# Patient Record
Sex: MALE | URBAN - METROPOLITAN AREA
[De-identification: names, ages, dates, MRNs, and addresses within clinical notes are randomized per-mention and may not be internally consistent; named-entity substitution may affect disease eponyms.]

---

## 2024-11-16 ENCOUNTER — OFFICE VISIT (OUTPATIENT)
Dept: URGENT CARE | Age: 24
End: 2024-11-16
Payer: COMMERCIAL

## 2024-11-16 VITALS
DIASTOLIC BLOOD PRESSURE: 102 MMHG | HEART RATE: 112 BPM | OXYGEN SATURATION: 97 % | TEMPERATURE: 98.3 F | BODY MASS INDEX: 42.66 KG/M2 | WEIGHT: 315 LBS | RESPIRATION RATE: 20 BRPM | HEIGHT: 72 IN | SYSTOLIC BLOOD PRESSURE: 139 MMHG

## 2024-11-16 DIAGNOSIS — S61.217A LACERATION OF LEFT LITTLE FINGER WITHOUT FOREIGN BODY WITHOUT DAMAGE TO NAIL, INITIAL ENCOUNTER: Primary | ICD-10-CM

## 2024-11-16 ASSESSMENT — ENCOUNTER SYMPTOMS
GASTROINTESTINAL NEGATIVE: 1
HEMATOLOGIC/LYMPHATIC NEGATIVE: 1
WOUND: 1
CARDIOVASCULAR NEGATIVE: 1
MUSCULOSKELETAL NEGATIVE: 1
PSYCHIATRIC NEGATIVE: 1
CONSTITUTIONAL NEGATIVE: 1
WEAKNESS: 0
ALLERGIC/IMMUNOLOGIC NEGATIVE: 1
RESPIRATORY NEGATIVE: 1
NUMBNESS: 0
EYES NEGATIVE: 1
ENDOCRINE NEGATIVE: 1

## 2024-11-16 NOTE — PATIENT INSTRUCTIONS
Wash wound with mild soap and water daily  Keep wound site clean and dry  Bacitracin and dry dressing daily  Start to keep wound open to the air in 2-3 days when it can be kept clean and dry  Suture removal in 10 days  Pcp follow up this week for wound check  ER visit anytime 24/7 for acute worsening or changing condition

## 2024-11-16 NOTE — PROGRESS NOTES
Subjective   Patient ID: Marlo Walden is a 24 y.o. male. They present today with a chief complaint of Laceration.    History of Present Illness    History provided by:  Patient   used: No    Laceration    This is a 24 yr old male here for left 5th finger laceration. Cut the finger on a household knife prior to  arrival. No difficulty with finger ROM, or parasthesias or weakness. Last tetanus unknown.  Past Medical History  Allergies as of 11/16/2024    (No Known Allergies)       (Not in a hospital admission)       No past medical history on file.    No past surgical history on file.     reports that he has never smoked. He has never used smokeless tobacco.    Review of Systems  Review of Systems   Constitutional: Negative.    HENT: Negative.     Eyes: Negative.    Respiratory: Negative.     Cardiovascular: Negative.    Gastrointestinal: Negative.    Endocrine: Negative.    Genitourinary: Negative.    Musculoskeletal: Negative.    Skin:  Positive for wound.   Allergic/Immunologic: Negative.    Neurological:  Negative for weakness and numbness.   Hematological: Negative.    Psychiatric/Behavioral: Negative.     All other systems reviewed and are negative.    Objective    Vitals:    11/16/24 1606   BP: (!) 139/102   BP Location: Right arm   Patient Position: Sitting   BP Cuff Size: Large adult   Pulse: (!) 112   Resp: 20   Temp: 36.8 °C (98.3 °F)   TempSrc: Oral   SpO2: 97%   Weight: (!) 163 kg (360 lb)   Height: 1.829 m (6')     No LMP for male patient.    Physical Exam  Vitals and nursing note reviewed.   Constitutional:       Appearance: Normal appearance.   HENT:      Head: Normocephalic and atraumatic.   Cardiovascular:      Rate and Rhythm: Normal rate and regular rhythm.   Pulmonary:      Effort: Pulmonary effort is normal.      Breath sounds: Normal breath sounds.   Musculoskeletal:         General: Signs of injury present.   Skin:     General: Skin is warm and dry.      Comments: 2 cm  laceration left 5th finger pad, Distal motor, n-v intact   Neurological:      General: No focal deficit present.      Mental Status: He is alert and oriented to person, place, and time.   Psychiatric:         Mood and Affect: Mood normal.         Behavior: Behavior normal.       Procedures  Left 5th finger laceration repair-Local anesthesia with 1% plain lidocaine. Wound cleansed with hibiclens and normal saline. Sterile draping placed. 5 4-0 nylon simple interrupted sutures placed. Hemostasis achieved. Bacitracin/DSD applied.     Point of Care Test & Imaging Results from this visit  No results found for this visit on 11/16/24.   No results found.    Diagnostic study results (if any) were reviewed by Liana Man PA-C.    Assessment/Plan   Allergies, medications, history, and pertinent labs/EKGs/Imaging reviewed by Liana Man PA-C.       Orders and Diagnoses  Diagnoses and all orders for this visit:  Laceration of left little finger without foreign body without damage to nail, initial encounter  -     Tdap vaccine, age 7 years and older  (BOOSTRIX)    Plan:  Laceration repair as above  Tetanus updated  Wound care daily with mild soap and water and bacitracin/DSD daily  SR in 10 days  Pcp follow up for wound check next week  ER visit anytime 24/7 for acute worsening or changing condition    Patient disposition: Home    Electronically signed by Liana Man PA-C  6:54 PM

## 2024-11-26 ENCOUNTER — OFFICE VISIT (OUTPATIENT)
Dept: URGENT CARE | Age: 24
End: 2024-11-26
Payer: COMMERCIAL

## 2024-11-26 VITALS
TEMPERATURE: 98.3 F | OXYGEN SATURATION: 98 % | HEART RATE: 98 BPM | SYSTOLIC BLOOD PRESSURE: 139 MMHG | BODY MASS INDEX: 42.66 KG/M2 | HEIGHT: 72 IN | RESPIRATION RATE: 20 BRPM | DIASTOLIC BLOOD PRESSURE: 96 MMHG | WEIGHT: 315 LBS

## 2024-11-26 DIAGNOSIS — Z48.02 VISIT FOR SUTURE REMOVAL: Primary | ICD-10-CM

## 2024-11-26 ASSESSMENT — ENCOUNTER SYMPTOMS
COUGH: 0
JOINT SWELLING: 0
COLOR CHANGE: 0
CHILLS: 0
WHEEZING: 0
NUMBNESS: 0
WOUND: 1
WEAKNESS: 0
MYALGIAS: 0
ARTHRALGIAS: 0
FEVER: 0
CHEST TIGHTNESS: 0
SHORTNESS OF BREATH: 0

## 2024-11-26 ASSESSMENT — PAIN SCALES - GENERAL: PAINLEVEL_OUTOF10: 0-NO PAIN

## 2024-11-26 NOTE — PROGRESS NOTES
Subjective   Patient ID: Marlo Walden is a 24 y.o. male. They present today with a chief complaint of Suture / Staple Removal (Pt got 5 stitches placed and needs them removed.).    History of Present Illness    History provided by:  Patient   used: No    Suture / Staple Removal   The sutures were placed 7 to 10 days ago (11/16/24). There has been no drainage from the wound. There is no redness present. There is no swelling present. There is no pain present. He has no difficulty moving the affected extremity or digit.       Past Medical History  Allergies as of 11/26/2024    (No Known Allergies)       (Not in a hospital admission)       History reviewed. No pertinent past medical history.    History reviewed. No pertinent surgical history.     reports that he has never smoked. He has never used smokeless tobacco.    Review of Systems  Review of Systems   Constitutional:  Negative for chills and fever.   Respiratory:  Negative for cough, chest tightness, shortness of breath and wheezing.    Cardiovascular:  Negative for chest pain.   Musculoskeletal:  Negative for arthralgias, joint swelling and myalgias.   Skin:  Positive for wound. Negative for color change.   Neurological:  Negative for weakness and numbness.                                  Objective    Vitals:    11/26/24 0944   BP: (!) 139/96   BP Location: Right arm   Patient Position: Sitting   BP Cuff Size: Large adult   Pulse: 98   Resp: 20   Temp: 36.8 °C (98.3 °F)   TempSrc: Oral   SpO2: 98%   Weight: (!) 161 kg (354 lb)   Height: 1.829 m (6')     No LMP for male patient.    Physical Exam  Vitals reviewed.   Constitutional:       General: He is not in acute distress.     Appearance: He is normal weight.   Cardiovascular:      Rate and Rhythm: Normal rate and regular rhythm.      Heart sounds: Normal heart sounds.   Pulmonary:      Effort: Pulmonary effort is normal.      Breath sounds: Normal breath sounds.   Musculoskeletal:          General: Signs of injury present. No swelling or tenderness.   Skin:     Findings: No bruising or erythema.   Neurological:      Mental Status: He is alert.         Suture Removal    Date/Time: 11/26/2024 10:05 AM    Performed by: Mario Alvarenga DO  Authorized by: Mario Alvarenga DO    Consent:     Consent obtained:  Verbal    Consent given by:  Patient  Location:     Location:  Upper extremity    Upper extremity location:  Hand    Hand location:  L small finger  Procedure details:     Wound appearance:  No signs of infection    Number of sutures removed:  5  Post-procedure details:     Post-removal:  No dressing applied    Procedure completion:  Tolerated well, no immediate complications      Point of Care Test & Imaging Results from this visit  No results found for this visit on 11/26/24.   No results found.    Diagnostic study results (if any) were reviewed by Mario Alvarenga DO.    Assessment/Plan   Allergies, medications, history, and pertinent labs/EKGs/Imaging reviewed by Mario Alvarenga DO.     Orders and Diagnoses  There are no diagnoses linked to this encounter.    Medical Admin Record      Patient disposition: Home    Electronically signed by Mario Alvarenga DO  9:58 AM